# Patient Record
(demographics unavailable — no encounter records)

---

## 2025-06-24 NOTE — PHYSICAL EXAM
[Abdomen Masses] : No abdominal masses [Abdomen Tenderness] : ~T No ~M abdominal tenderness [No HSM] : no hepatosplenomegaly [No Rash or Lesion] : No rash or lesion [Alert] : alert [Oriented to Person] : oriented to person [Oriented to Place] : oriented to place [Oriented to Time] : oriented to time [Calm] : calm [de-identified] : Soft [de-identified] : External - anal skin tags; HAL uncomfortable, but normal; Anoscopy deferred [de-identified] : Normal [de-identified] : Normal [de-identified] : Normal [de-identified] : Normal [de-identified] : Normal

## 2025-06-24 NOTE — HISTORY OF PRESENT ILLNESS
[FreeTextEntry1] : 33-year-old female pt complaining of hemorrhoids Has been bothering her for 3 months She bleeds with every BM. Sometimes in the toilet and sometimes when she wipes. Painful all the time, hurts to sit Feels wet all the time, that she has to wear a pad PMD gave her hydrocortisone, but it didn't help Feels 2 little balls on the outside and she feels something on the inside as well Tried warm sitz baths Has to strain to have a BM Takes Miralax but it doesn't help  Never had a colonoscopy

## 2025-06-24 NOTE — PHYSICAL EXAM
[Abdomen Masses] : No abdominal masses [Abdomen Tenderness] : ~T No ~M abdominal tenderness [No HSM] : no hepatosplenomegaly [No Rash or Lesion] : No rash or lesion [Alert] : alert [Oriented to Person] : oriented to person [Oriented to Place] : oriented to place [Oriented to Time] : oriented to time [Calm] : calm [de-identified] : Soft [de-identified] : External - anal skin tags; HAL uncomfortable, but normal; Anoscopy deferred [de-identified] : Normal [de-identified] : Normal [de-identified] : Normal [de-identified] : Normal [de-identified] : Normal

## 2025-06-24 NOTE — ASSESSMENT
[FreeTextEntry1] : 33 F w/ symptomatic hemorrhoids. Keen to have surgery. On exam, she has multiple anal skin tags. Plan: Discussed surgery: anoscopy, excision of anal skin tags, possible banding of hemorrhoids, possible excision of hemorrhoids. Also discussed usual postop recovery and possible complications. She would like to schedule. High fiber diet. Laxatives as needed. All questions answered.